# Patient Record
Sex: FEMALE | Race: WHITE | NOT HISPANIC OR LATINO | URBAN - METROPOLITAN AREA
[De-identification: names, ages, dates, MRNs, and addresses within clinical notes are randomized per-mention and may not be internally consistent; named-entity substitution may affect disease eponyms.]

---

## 2023-10-04 ENCOUNTER — APPOINTMENT (OUTPATIENT)
Dept: PRIMARY CARE | Facility: CLINIC | Age: 73
End: 2023-10-04
Payer: COMMERCIAL

## 2024-01-19 ENCOUNTER — TELEPHONE (OUTPATIENT)
Dept: PRIMARY CARE | Facility: CLINIC | Age: 74
End: 2024-01-19
Payer: COMMERCIAL

## 2024-01-19 NOTE — TELEPHONE ENCOUNTER
----- Message from Grace Guzman MD sent at 1/19/2024 11:26 AM EST -----  Please check with patient if had follow-up diagnostic mammogram and ultrasound. Looks like had surgery so may have had testing but cannot see report in chart. Just want to confirm up to date with f/up of mammogram from 6/2023.

## 2024-01-19 NOTE — TELEPHONE ENCOUNTER
"Called pt.  States \"I did not have a follow up mammogram.  I did see the breast surgeon twice, and she agreed that it was due to surgery and scarring and now the lump is totally gone.  She did not feel that anything else needed to be done.\"    "

## 2024-04-02 ENCOUNTER — APPOINTMENT (OUTPATIENT)
Dept: PRIMARY CARE | Facility: CLINIC | Age: 74
End: 2024-04-02
Payer: COMMERCIAL

## 2024-04-04 ENCOUNTER — APPOINTMENT (OUTPATIENT)
Dept: PRIMARY CARE | Facility: CLINIC | Age: 74
End: 2024-04-04
Payer: COMMERCIAL

## 2024-04-30 ENCOUNTER — APPOINTMENT (OUTPATIENT)
Dept: PRIMARY CARE | Facility: CLINIC | Age: 74
End: 2024-04-30

## 2024-05-09 ENCOUNTER — APPOINTMENT (OUTPATIENT)
Dept: PRIMARY CARE | Facility: CLINIC | Age: 74
End: 2024-05-09

## 2024-05-29 PROBLEM — R87.810 CERVICAL HIGH RISK HUMAN PAPILLOMAVIRUS (HPV) DNA TEST POSITIVE: Status: ACTIVE | Noted: 2024-05-29

## 2024-05-29 PROBLEM — N63.0 BREAST MASS: Status: ACTIVE | Noted: 2024-05-29

## 2024-05-29 PROBLEM — Z95.0 H/O CARDIAC PACEMAKER: Status: ACTIVE | Noted: 2024-05-29

## 2024-05-29 PROBLEM — E55.9 VITAMIN D DEFICIENCY: Status: ACTIVE | Noted: 2024-05-29

## 2024-05-29 PROBLEM — E66.9 OBESE: Status: ACTIVE | Noted: 2024-05-29

## 2024-05-29 PROBLEM — E04.2 MULTINODULAR GOITER: Status: ACTIVE | Noted: 2024-05-29

## 2024-05-29 PROBLEM — D72.819 LEUKOPENIA: Status: ACTIVE | Noted: 2024-05-29

## 2024-05-29 PROBLEM — Z98.890 PONV (POSTOPERATIVE NAUSEA AND VOMITING): Status: ACTIVE | Noted: 2023-02-02

## 2024-05-29 PROBLEM — R11.2 PONV (POSTOPERATIVE NAUSEA AND VOMITING): Status: ACTIVE | Noted: 2023-02-02

## 2024-05-29 RX ORDER — PNV NO.95/FERROUS FUM/FOLIC AC 28MG-0.8MG
1 TABLET ORAL DAILY
COMMUNITY

## 2024-05-29 RX ORDER — CYCLOSPORINE 0.5 MG/ML
1 EMULSION OPHTHALMIC 2 TIMES DAILY
COMMUNITY

## 2024-05-29 RX ORDER — CHOLECALCIFEROL (VITAMIN D3) 50 MCG
1 TABLET ORAL DAILY
COMMUNITY

## 2024-05-30 ENCOUNTER — OFFICE VISIT (OUTPATIENT)
Dept: PRIMARY CARE | Facility: CLINIC | Age: 74
End: 2024-05-30
Payer: COMMERCIAL

## 2024-05-30 VITALS
HEART RATE: 60 BPM | TEMPERATURE: 97.4 F | SYSTOLIC BLOOD PRESSURE: 128 MMHG | BODY MASS INDEX: 30.51 KG/M2 | DIASTOLIC BLOOD PRESSURE: 82 MMHG | WEIGHT: 165.8 LBS | HEIGHT: 62 IN | OXYGEN SATURATION: 97 %

## 2024-05-30 DIAGNOSIS — D72.819 LEUKOPENIA, UNSPECIFIED TYPE: ICD-10-CM

## 2024-05-30 DIAGNOSIS — G89.29 CHRONIC LOW BACK PAIN, UNSPECIFIED BACK PAIN LATERALITY, UNSPECIFIED WHETHER SCIATICA PRESENT: ICD-10-CM

## 2024-05-30 DIAGNOSIS — I26.99 OTHER PULMONARY EMBOLISM WITHOUT ACUTE COR PULMONALE, UNSPECIFIED CHRONICITY (MULTI): ICD-10-CM

## 2024-05-30 DIAGNOSIS — E28.319 ASYMPTOMATIC PREMATURE MENOPAUSE: ICD-10-CM

## 2024-05-30 DIAGNOSIS — M54.50 CHRONIC LOW BACK PAIN, UNSPECIFIED BACK PAIN LATERALITY, UNSPECIFIED WHETHER SCIATICA PRESENT: ICD-10-CM

## 2024-05-30 DIAGNOSIS — Z12.31 ENCOUNTER FOR SCREENING MAMMOGRAM FOR BREAST CANCER: Primary | ICD-10-CM

## 2024-05-30 DIAGNOSIS — Z13.220 SCREENING, LIPID: ICD-10-CM

## 2024-05-30 DIAGNOSIS — M54.2 NECK PAIN: ICD-10-CM

## 2024-05-30 DIAGNOSIS — E55.9 VITAMIN D DEFICIENCY: ICD-10-CM

## 2024-05-30 PROCEDURE — 1036F TOBACCO NON-USER: CPT | Performed by: INTERNAL MEDICINE

## 2024-05-30 PROCEDURE — 1159F MED LIST DOCD IN RCRD: CPT | Performed by: INTERNAL MEDICINE

## 2024-05-30 PROCEDURE — 1125F AMNT PAIN NOTED PAIN PRSNT: CPT | Performed by: INTERNAL MEDICINE

## 2024-05-30 PROCEDURE — 99214 OFFICE O/P EST MOD 30 MIN: CPT | Performed by: INTERNAL MEDICINE

## 2024-05-30 RX ORDER — IBUPROFEN 200 MG
400 TABLET ORAL NIGHTLY PRN
COMMUNITY

## 2024-05-30 RX ORDER — TRETINOIN 0.5 MG/G
CREAM TOPICAL NIGHTLY
COMMUNITY
Start: 2024-04-18

## 2024-05-30 RX ORDER — CYCLOBENZAPRINE HCL 5 MG
5 TABLET ORAL NIGHTLY PRN
Qty: 5 TABLET | Refills: 0 | Status: SHIPPED | OUTPATIENT
Start: 2024-05-30

## 2024-05-30 ASSESSMENT — PAIN SCALES - GENERAL: PAINLEVEL: 8

## 2024-05-30 ASSESSMENT — PATIENT HEALTH QUESTIONNAIRE - PHQ9
2. FEELING DOWN, DEPRESSED OR HOPELESS: NOT AT ALL
1. LITTLE INTEREST OR PLEASURE IN DOING THINGS: NOT AT ALL
SUM OF ALL RESPONSES TO PHQ9 QUESTIONS 1 & 2: 0

## 2024-05-30 NOTE — PROGRESS NOTES
"  Chief Complaint   Patient presents with    Back Pain    Neck Pain     Pt here with c/o lower back pain, onset 6 months, and neck pain, onset 2-3 months.     74 year old woman  Last visit 6/2023.  At last visit ordered left diagnostic mammogram and ultrasound but not done.  Area on breast completely resolved. Did see surgeon and told that could happen    Years ago fell buttock. Fracture at that time.  No recent trauma. No fever/chills  No radiation down legs.  Constant pain  Taking nsaid at night but not helping  Left sided neck pain. No shoulder pain. No radiation down arms. No weakness.  If stretches will not wake up at night.      Past medical history  Pulmonary embolism 2013 while on hormone replacement therapy.  Bradycardia, pacemaker Dr Sifuentes  Chronic neutropenia previously evaluated by Dr. Leal  Multinodular goiter Dr Clayton  Knee pain Dr Puente  colon polyp colonoscopy 2017  Osteopenia Dexa 2022   left breast reduction 11/2022 [facility] Dr Felipe.    Nonsmoker  works at . boyfriend passed away. she took care of his son. she has two daughters  still working, works from home.      PHYSICAL EXAM:    Blood pressure 128/82, pulse 60, temperature 36.3 °C (97.4 °F), height 1.575 m (5' 2\"), weight 75.2 kg (165 lb 12.8 oz), SpO2 97%.  Body mass index is 30.33 kg/m².     Vital reviewed  Neck: no cervical/clavicular adenopathy  CV: RRR S1 S2 normal. No murmur. No carotid bruit.   Lungs: CTA without wrr. Breath sounds symmetric  Extremities: no pretibial edema  Neuro: speech intact. LE strength 5/5  Msk +left paraspinal neck pain  SLR negative  Sensation intact to light touch    09/2022 Dr HOPKINS. cbc, bmp  wbc 6.6 hg 13.5 platelet 235  Na 139 K 4.5 Cr 0.7 glucose 85     02/2022 Dr HOPKINS Cbc, bmp  wbc 6.6 hg 13.5 platelet 236  K 4.5 cr 0.7 glucose 85 Na 139     7/2021 tsh, t4, t3, cbc, cmp  White blood cell count 5.5 hemoglobin 13.7  Creatinine 0.6 glucose 80 liver function test negative TSH low at 0.21 creatinine 0.6 normal " t3 and t4  lipid done through work in 2021     ASSESSMENT AND PLAN:  1. Encounter for screening mammogram for breast cancer  - BI mammo bilateral screening tomosynthesis; Future    2. Chronic low back pain, unspecified back pain laterality, unspecified whether sciatica present    - XR lumbar spine complete 4+ views; Future  - XR sacrum coccyx 2+ views; Future  - PT eval and treat; Future    3. Neck pain  - XR cervical spine complete 4-5 views; Future  - PT eval and treat; Future  - cyclobenzaprine (Flexeril) 5 mg tablet; Take 1 tablet (5 mg) by mouth as needed at bedtime for muscle spasms.  Dispense: 5 tablet; Refill: 0    4. Vitamin D deficiency   Normal level 3/2024 at 38. Checked by Dr Clayton    5. Leukopenia, unspecified type    - CBC; Future    6. Other pulmonary embolism without acute cor pulmonale, unspecified chronicity (Multi)    - Comprehensive Metabolic Panel; Future    7. Screening, lipid  - Lipid Panel; Future    8. Asymptomatic premature menopause    - XR DEXA bone density; Future    Mammogram 01/2023 negative-ordered.  Dexa 01/2021 T -2.1-ordered  colonoscopy 9/2017 Dr Sharpe due 2022- has scheduled for 6/14.      Goiter us and thyroid studies per Dr Clayton  Breast mass resolved.she did see surgeon.    Current Outpatient Medications on File Prior to Visit   Medication Sig Dispense Refill    cholecalciferol (Vitamin D-3) 50 MCG (2000 UT) tablet Take 1 tablet (2,000 Units) by mouth once daily.      cyanocobalamin (Vitamin B-12) 100 mcg tablet Take 1 tablet (100 mcg) by mouth once daily.      ibuprofen 200 mg tablet Take 2 tablets (400 mg) by mouth as needed at bedtime for mild pain (1 - 3).      MAGNESIUM OXIDE ORAL 1 tablet once daily.      Restasis 0.05 % ophthalmic emulsion Administer 1 drop into both eyes twice a day.      tretinoin (Retin-A) 0.05 % cream Apply topically once daily at bedtime.      ZINC ACETATE ORAL Take 1 tablet by mouth once daily.       No current facility-administered  medications on file prior to visit.

## 2024-05-31 ENCOUNTER — TELEPHONE (OUTPATIENT)
Dept: PRIMARY CARE | Facility: CLINIC | Age: 74
End: 2024-05-31
Payer: COMMERCIAL

## 2024-05-31 NOTE — TELEPHONE ENCOUNTER
----- Message from Grace Guzman MD sent at 5/30/2024  5:41 PM EDT -----  Xray of coccyx is ok. Xray of lumbar spine shows changes consistent with moderate arthritis. Xray of cervical spine shows moderate arthritis and foraminal stenosis (narrowing of the openings for nerves to leave the spine to supply the arm. This is from arthritis.

## 2024-07-06 ENCOUNTER — LAB (OUTPATIENT)
Dept: LAB | Facility: LAB | Age: 74
End: 2024-07-06
Payer: COMMERCIAL

## 2024-07-06 DIAGNOSIS — Z13.220 SCREENING, LIPID: ICD-10-CM

## 2024-07-06 DIAGNOSIS — I26.99 OTHER PULMONARY EMBOLISM WITHOUT ACUTE COR PULMONALE, UNSPECIFIED CHRONICITY (MULTI): ICD-10-CM

## 2024-07-06 DIAGNOSIS — D72.819 LEUKOPENIA, UNSPECIFIED TYPE: ICD-10-CM

## 2024-07-06 LAB
ALBUMIN SERPL BCP-MCNC: 4.2 G/DL (ref 3.4–5)
ALP SERPL-CCNC: 59 U/L (ref 33–136)
ALT SERPL W P-5'-P-CCNC: 16 U/L (ref 7–45)
ANION GAP SERPL CALC-SCNC: 11 MMOL/L (ref 10–20)
AST SERPL W P-5'-P-CCNC: 17 U/L (ref 9–39)
BILIRUB SERPL-MCNC: 0.4 MG/DL (ref 0–1.2)
BUN SERPL-MCNC: 26 MG/DL (ref 6–23)
CALCIUM SERPL-MCNC: 9.4 MG/DL (ref 8.6–10.6)
CHLORIDE SERPL-SCNC: 104 MMOL/L (ref 98–107)
CHOLEST SERPL-MCNC: 216 MG/DL (ref 0–199)
CHOLESTEROL/HDL RATIO: 2.8
CO2 SERPL-SCNC: 30 MMOL/L (ref 21–32)
CREAT SERPL-MCNC: 0.7 MG/DL (ref 0.5–1.05)
EGFRCR SERPLBLD CKD-EPI 2021: >90 ML/MIN/1.73M*2
ERYTHROCYTE [DISTWIDTH] IN BLOOD BY AUTOMATED COUNT: 12.2 % (ref 11.5–14.5)
GLUCOSE SERPL-MCNC: 88 MG/DL (ref 74–99)
HCT VFR BLD AUTO: 40.1 % (ref 36–46)
HDLC SERPL-MCNC: 77.1 MG/DL
HGB BLD-MCNC: 13.5 G/DL (ref 12–16)
LDLC SERPL CALC-MCNC: 117 MG/DL
MCH RBC QN AUTO: 30.1 PG (ref 26–34)
MCHC RBC AUTO-ENTMCNC: 33.7 G/DL (ref 32–36)
MCV RBC AUTO: 89 FL (ref 80–100)
NON HDL CHOLESTEROL: 139 MG/DL (ref 0–149)
NRBC BLD-RTO: 0 /100 WBCS (ref 0–0)
PLATELET # BLD AUTO: 223 X10*3/UL (ref 150–450)
POTASSIUM SERPL-SCNC: 4.4 MMOL/L (ref 3.5–5.3)
PROT SERPL-MCNC: 6.3 G/DL (ref 6.4–8.2)
RBC # BLD AUTO: 4.49 X10*6/UL (ref 4–5.2)
SODIUM SERPL-SCNC: 141 MMOL/L (ref 136–145)
TRIGL SERPL-MCNC: 111 MG/DL (ref 0–149)
VLDL: 22 MG/DL (ref 0–40)
WBC # BLD AUTO: 4.3 X10*3/UL (ref 4.4–11.3)

## 2024-07-06 PROCEDURE — 80053 COMPREHEN METABOLIC PANEL: CPT

## 2024-07-06 PROCEDURE — 36415 COLL VENOUS BLD VENIPUNCTURE: CPT

## 2024-07-06 PROCEDURE — 80061 LIPID PANEL: CPT

## 2024-07-06 PROCEDURE — 85027 COMPLETE CBC AUTOMATED: CPT

## 2024-07-09 ENCOUNTER — APPOINTMENT (OUTPATIENT)
Dept: PHYSICAL THERAPY | Facility: CLINIC | Age: 74
End: 2024-07-09
Payer: COMMERCIAL

## 2024-07-17 ENCOUNTER — TELEPHONE (OUTPATIENT)
Dept: PRIMARY CARE | Facility: CLINIC | Age: 74
End: 2024-07-17
Payer: COMMERCIAL

## 2024-07-17 DIAGNOSIS — R92.8 ABNORMAL MAMMOGRAM: Primary | ICD-10-CM

## 2024-07-17 NOTE — TELEPHONE ENCOUNTER
Called discussed mammogram results with pt. She will call sw tomorrow to schedule additional imaging

## 2024-07-22 ENCOUNTER — HOSPITAL ENCOUNTER (OUTPATIENT)
Dept: RADIOLOGY | Facility: CLINIC | Age: 74
Discharge: HOME | End: 2024-07-22
Payer: COMMERCIAL

## 2024-07-22 DIAGNOSIS — R92.8 ABNORMAL MAMMOGRAM: ICD-10-CM

## 2024-07-24 ENCOUNTER — HOSPITAL ENCOUNTER (OUTPATIENT)
Dept: RADIOLOGY | Facility: EXTERNAL LOCATION | Age: 74
Discharge: HOME | End: 2024-07-24

## 2024-07-24 ENCOUNTER — HOSPITAL ENCOUNTER (OUTPATIENT)
Dept: RADIOLOGY | Facility: CLINIC | Age: 74
End: 2024-07-24
Payer: COMMERCIAL

## 2024-07-24 ENCOUNTER — HOSPITAL ENCOUNTER (OUTPATIENT)
Dept: RADIOLOGY | Facility: CLINIC | Age: 74
Discharge: HOME | End: 2024-07-24
Payer: COMMERCIAL

## 2024-07-24 DIAGNOSIS — R92.8 ABNORMAL MAMMOGRAM: ICD-10-CM

## 2024-07-24 PROCEDURE — G0279 TOMOSYNTHESIS, MAMMO: HCPCS | Mod: RIGHT SIDE | Performed by: STUDENT IN AN ORGANIZED HEALTH CARE EDUCATION/TRAINING PROGRAM

## 2024-07-24 PROCEDURE — 77065 DX MAMMO INCL CAD UNI: CPT | Mod: RIGHT SIDE | Performed by: STUDENT IN AN ORGANIZED HEALTH CARE EDUCATION/TRAINING PROGRAM

## 2024-07-24 PROCEDURE — 77065 DX MAMMO INCL CAD UNI: CPT | Mod: RT

## 2024-07-25 ENCOUNTER — TELEPHONE (OUTPATIENT)
Dept: PRIMARY CARE | Facility: CLINIC | Age: 74
End: 2024-07-25
Payer: COMMERCIAL

## 2024-07-25 NOTE — TELEPHONE ENCOUNTER
----- Message from Grace Guzman sent at 7/24/2024  2:33 PM EDT -----  Right diagnostic mammogram shows benign changes. Recommend repeat in 1 year

## 2024-09-10 ENCOUNTER — OFFICE VISIT (OUTPATIENT)
Dept: ORTHOPEDIC SURGERY | Facility: CLINIC | Age: 74
End: 2024-09-10
Payer: COMMERCIAL

## 2024-09-10 DIAGNOSIS — M47.812 MULTILEVEL CERVICAL SPONDYLOSIS WITHOUT MYELOPATHY: ICD-10-CM

## 2024-09-10 DIAGNOSIS — M47.816 SPONDYLOSIS OF LUMBAR REGION WITHOUT MYELOPATHY OR RADICULOPATHY: ICD-10-CM

## 2024-09-10 DIAGNOSIS — M50.30 DEGENERATIVE DISC DISEASE, CERVICAL: Primary | ICD-10-CM

## 2024-09-10 PROCEDURE — 99203 OFFICE O/P NEW LOW 30 MIN: CPT | Performed by: PHYSICIAN ASSISTANT

## 2024-09-10 PROCEDURE — 99213 OFFICE O/P EST LOW 20 MIN: CPT | Performed by: PHYSICIAN ASSISTANT

## 2024-09-10 PROCEDURE — 1159F MED LIST DOCD IN RCRD: CPT | Performed by: PHYSICIAN ASSISTANT

## 2024-09-10 PROCEDURE — 1125F AMNT PAIN NOTED PAIN PRSNT: CPT | Performed by: PHYSICIAN ASSISTANT

## 2024-09-10 RX ORDER — MELOXICAM 15 MG/1
15 TABLET ORAL DAILY
Qty: 90 TABLET | Refills: 2 | Status: SHIPPED | OUTPATIENT
Start: 2024-09-10

## 2024-09-10 ASSESSMENT — PAIN DESCRIPTION - DESCRIPTORS: DESCRIPTORS: ACHING;THROBBING;SORE

## 2024-09-10 ASSESSMENT — PAIN SCALES - GENERAL: PAINLEVEL_OUTOF10: 8

## 2024-09-10 ASSESSMENT — PAIN - FUNCTIONAL ASSESSMENT: PAIN_FUNCTIONAL_ASSESSMENT: 0-10

## 2024-09-11 NOTE — PROGRESS NOTES
HPI:  Vane Lovell is a 74 y.o. female who presents to the spine clinic for evaluation of neck and low back pain.     Reports pain ongoing for several months. No injury or inciting event. Back pain more bothersome than the neck and is worse with activity such as bending. Denies radiating pain in the upper or lower extremities. Denies numbness/tingling/weakness. No issues with walking/balance/coordination.     She has seen her PCP who ordered xrays which she brought today for review. She was been following with a chiropractor for several months undergoing adjustments without improvement of pain. She has not had a HEP or PT. Not taking any medication at this time.    She is not diabetic. She is not a smoker.     ROS:  Reviewed on EMR and patient intake sheet.    PMH/SH:  Reviewed on EMR and patient intake sheet.    Exam:  Physical Exam  Spine Musculoskeletal Exam    Well appearing, NAD  Pleasant & cooperative  BMI 30.33  Decreased ROM lumbar spine with rotation, flexion/extension  No paraspinal muscle spasms  Upper & lower extremity sensation intact to light touch  Upper & lower extremity motor 5/5 throughout  normal gait & station  Hyporeflexic; neg hoffmans  Neg SLR bl    Radiology:     Xrays cervical and lumbar spine dated 05/30/24 personally reviewed along with the accompanying rad reports.   Straightening of the normal cervical lordosis. No fractures. Moderate disc degeneration worst at C4-5 with anterior osteophyte formation.  Mild levoscoliosis of the lumbar spine. No fractures or spondylolisthesis. Mild disc space loss throughout the lumbar spine.     Assessment and Plan:  1. Multilevel cervical spondylosis without myelopathy  - Referral to Physical Therapy; Future  - Referral to Pain Management; Future    2. Degenerative disc disease, cervical    3. Spondylosis of lumbar region without myelopathy or radiculopathy  - meloxicam (Mobic) 15 mg tablet; Take 1 tablet (15 mg) by mouth once daily.  Dispense: 90  tablet; Refill: 2    I reviewed the imaging studies with Vane Lovell in detail today. Patient with axial spine pain that does not radiate into the upper or lower extremities. We discussed the pathology and natural course of degenerative disc disease and spondylosis. I educated the patient on several lifestyle modifications that include increased walking, weight management, and a routine exercise plan that includes core strengthening. The patient was educated that this pain may fluctuate over time.     Referral placed to physical therapy today. Prescription for Meloxicam provided for pain relief.     Patient requested referral to pain management for consideration of injections, which was provided today.    She can follow up on an as needed basis. No role for surgical intervention at this time.    Patient in agreement to plan of care. Of course Vane Ellswortherham is welcome to call at anytime with questions or concerns.     Eugenia Larson PA-C  Department of Orthopaedic Surgery    [address] [place] [address]    Voicemail: [phone]   Appts: [phone]  Fax: [phone]

## 2024-10-03 ENCOUNTER — OFFICE VISIT (OUTPATIENT)
Dept: PAIN MEDICINE | Facility: HOSPITAL | Age: 74
End: 2024-10-03
Payer: COMMERCIAL

## 2024-10-03 DIAGNOSIS — M47.812 MULTILEVEL CERVICAL SPONDYLOSIS WITHOUT MYELOPATHY: ICD-10-CM

## 2024-10-03 DIAGNOSIS — M47.816 LUMBAR SPONDYLOSIS: ICD-10-CM

## 2024-10-03 PROCEDURE — 1159F MED LIST DOCD IN RCRD: CPT | Performed by: ANESTHESIOLOGY

## 2024-10-03 PROCEDURE — 1125F AMNT PAIN NOTED PAIN PRSNT: CPT | Performed by: ANESTHESIOLOGY

## 2024-10-03 PROCEDURE — 99211 OFF/OP EST MAY X REQ PHY/QHP: CPT | Performed by: ANESTHESIOLOGY

## 2024-10-03 ASSESSMENT — PAIN SCALES - GENERAL: PAINLEVEL: 7

## 2024-10-03 NOTE — PROGRESS NOTES
Subjective   Patient ID: Vane Lovell is a 74 y.o. female with a past medical history of sick sinus syndrome status post cardiac pacemaker, osteoarthritis who presents as a new patient referred by primary care physician for lower back pain.     HPI:   Vane Lovell is a 74 y.o. female with a past medical history of sick sinus syndrome status post cardiac pacemaker, osteoarthritis who presents as a new patient referred by primary care physician for lower back pain.  Patient reports that she has had lower back pain for many months that has progressively gotten worse. She does not remember any inciting events that lead to the pain. She reports that the pain is a 8/10 dull/achy pain that is worst in the morning and gets better throughout the day. Activities such as bending forward, twisting make the pain worse. She reports that the pain does wake her up at nighttime when it is severe. When she gets up she often does ab exercises and stretches her back out which helps alleviate the pain. She has tried topical creams, meloxicam without significant relief. She takes IBU sometimes at nighttime that she reports may be helping her pain a little bit. She reports no weakness in her BLE, numbness, or tingling. Denies bowel/bladder incontinence. She denies any tripping or falling. She has done chiropractic care with minimal relief. She reports that heat helps the pain. No radicular pain to her BLE    Physical Therapy:  starting PT next week  Other Conservative Measures she has tried: Chiropractic, Heating Pad, and Massage/myofascial release  Classes of medications tried in the past: Acetaminophen, NSAIDs, and Topical agents    Review of Systems   13-point ROS done and negative except for HPI.     Current Outpatient Medications   Medication Instructions    cholecalciferol (Vitamin D-3) 50 MCG (2000 UT) tablet 1 tablet, oral, Daily    ibuprofen 400 mg, oral, Nightly PRN    meloxicam (MOBIC) 15 mg, oral, Daily    Restasis  0.05 % ophthalmic emulsion 1 drop, Both Eyes, 2 times daily    tretinoin (Retin-A) 0.05 % cream Topical, Nightly       No past medical history on file.     Past Surgical History:   Procedure Laterality Date    OTHER SURGICAL HISTORY  2021    Thyroid biopsy core needle    OTHER SURGICAL HISTORY  2021    Colonoscopy    OTHER SURGICAL HISTORY  2021    Pacemaker insertion    OTHER SURGICAL HISTORY  2021     section    OTHER SURGICAL HISTORY  2021    Gallbladder surgery        Family History   Problem Relation Name Age of Onset    Other (malignant neoplasm) Mother      Other (malignant neoplasm) Father      Heart attack Brother          Allergies   Allergen Reactions    Hydrocortisone Other     ?tachycardia    Iodinated Contrast Media Hives    Lecithin Nausea/vomiting    Soy Unknown    Codeine Other, Rash and Nausea/vomiting     Nausea    Morphine (Pf) Other     nightmares        Objective     There were no vitals filed for this visit.     Physical Exam  General: NAD, well groomed, well nourished  Eyes: Non-icteric sclera, EOMI  Ears, Nose, Mouth, and Throat: External ears and nose appear to be without deformity or rash. No lesions or masses noted. Hearing is grossly intact.   Neck: Trachea midline  Respiratory: Nonlabored breathing   Cardiovascular: trace peripheral edema   Skin: No rashes or open lesions/ulcers identified on skin.    Back:   Palpation: Mild tenderness to palpation over lumbar paraspinous muscles.   Straight leg raise: does not reproduce their pain, bilaterally   Hip: No pain over greater trochanters. and Pain not reproduced with hip internal/external rotation.     Neurologic:   Cranial nerves grossly intact.   Strength 5/5 BLE and symmetric plantar/dorsiflexion   Sensation: Normal to light touch throughout intact throughout.  DTRs:normal and symmetric throughout    Psychiatric: Alert, orientation to person, place, and time. Cooperative.    Imaging personally  reviewed:   Lumbar Spine Xray (5/30/2024):  Findings:   There are 5 lumbar vertebral bodies with mild levoscoliosis. No fracture or compression deformity or significant listhesis. Multilevel small to moderate endplate spurs and only mild disc space narrowing. Lower lumbar facet arthropathy. Symmetric SI joints. No sacral lesion. Moderate to large amount of stool in the colon..   IMPRESSION:   Degenerative changes and mild levoscoliosis.     Cervical Spine Xray (5/30/24):  FINDINGS:   Lateral view shows straightening of lordosis but otherwise normal cervical body height and alignment. No aggressive bone lesion or compression deformity. There is moderate to severe disc space narrowing with large endplate spurs at multiple levels. Also moderate multilevel facet arthropathy. Bilateral uncinate spurring. Suggestion of foraminal stenosis at C3-4 bilaterally. Normal prevertebral soft tissue space and C1-C2.   IMPRESSION:   Moderate degenerative changes. Bilateral upper cervical foraminal stenosis.     Assessment/Plan   Vane Lovell is a 74 y.o. female with a past medical history of sick sinus syndrome status post cardiac pacemaker, osteoarthritis who presents as a new patient referred by primary care physician for lower back pain.  Pain is 8 out of 10 at its worst it is a dull achy pain that is located in the lower back.  It is axial back pain with no radicular symptoms and no weakness in her bilateral lower extremities or numbness or tingling.  She denies bowel or bladder incontinence.  Pain is worse with bending and twisting activities.  Pain is the worst in the morning gets better throughout the day.  She reports that the pain does wake her up in the middle the night sometimes.  She has tried meloxicam, topicals and chiropractic care.  She is going to start physical therapy next week.  She is x-rays done with multiple areas of arthritic changes.  On exam the patient has no motor or sensory deficits.  Patient likely  is having pain due to lumbar facet arthropathy.  We offer the patient bilateral diagnostic medial branch block and if she has good pain relief with this we can consider radiofrequency ablation for her.  We discussed that she should go to physical therapy and continue those exercises at home as tolerated.    Plan:  -Schedule patient for bilateral L3-4, L4-5 and L5-S1 diagnostic medial branch block #1 under fluoroscopy guidance  - We discussed  the risks, benefits and alternatives of the procedure including but not limited to: , Lack of efficacy , Transiently worsening pain , Bleeding, Infection , and Nerve Damage  -If patient has good relief with 2 diagnostic medial branch blocks then we can consider radiofrequency ablation  -Discussed with patient that physical therapy is an important part of the treatment process and that she should continue with every therapy exercises she learned during physical therapy at home as tolerated    Follow up: After procedure    The patient was invited to contact us back anytime with any questions or concerns and follow-up with us in the office as needed.     Diagnoses and all orders for this visit:  Multilevel cervical spondylosis without myelopathy  -     Referral to Pain Management      This note was generated with the aid of dictation software, there may be typos despite my attempts at proofreading.   The patient was discussed and seen with Dr. Sexton.  Reggie Adan MD  PGY-5  Interventional Pain Fellow

## 2024-10-08 ENCOUNTER — APPOINTMENT (OUTPATIENT)
Dept: PHYSICAL THERAPY | Facility: CLINIC | Age: 74
End: 2024-10-08
Payer: COMMERCIAL

## 2024-10-21 ENCOUNTER — HOSPITAL ENCOUNTER (OUTPATIENT)
Facility: HOSPITAL | Age: 74
Discharge: HOME | End: 2024-10-21
Payer: COMMERCIAL

## 2024-10-21 VITALS
RESPIRATION RATE: 17 BRPM | SYSTOLIC BLOOD PRESSURE: 186 MMHG | OXYGEN SATURATION: 100 % | HEIGHT: 62 IN | DIASTOLIC BLOOD PRESSURE: 98 MMHG | HEART RATE: 60 BPM | TEMPERATURE: 98 F | BODY MASS INDEX: 29.44 KG/M2 | WEIGHT: 160 LBS

## 2024-10-21 DIAGNOSIS — M47.816 LUMBAR SPONDYLOSIS: ICD-10-CM

## 2024-10-21 PROCEDURE — 64494 INJ PARAVERT F JNT L/S 2 LEV: CPT | Mod: 50 | Performed by: ANESTHESIOLOGY

## 2024-10-21 PROCEDURE — 64493 INJ PARAVERT F JNT L/S 1 LEV: CPT | Performed by: ANESTHESIOLOGY

## 2024-10-21 PROCEDURE — 64493 INJ PARAVERT F JNT L/S 1 LEV: CPT | Mod: 50 | Performed by: ANESTHESIOLOGY

## 2024-10-21 PROCEDURE — 2500000004 HC RX 250 GENERAL PHARMACY W/ HCPCS (ALT 636 FOR OP/ED): Performed by: ANESTHESIOLOGY

## 2024-10-21 PROCEDURE — 64494 INJ PARAVERT F JNT L/S 2 LEV: CPT | Performed by: ANESTHESIOLOGY

## 2024-10-21 RX ORDER — LIDOCAINE HYDROCHLORIDE 20 MG/ML
INJECTION, SOLUTION EPIDURAL; INFILTRATION; INTRACAUDAL; PERINEURAL
Status: COMPLETED | OUTPATIENT
Start: 2024-10-21 | End: 2024-10-21

## 2024-10-21 ASSESSMENT — ENCOUNTER SYMPTOMS: OCCASIONAL FEELINGS OF UNSTEADINESS: 0

## 2024-10-21 ASSESSMENT — COLUMBIA-SUICIDE SEVERITY RATING SCALE - C-SSRS
1. IN THE PAST MONTH, HAVE YOU WISHED YOU WERE DEAD OR WISHED YOU COULD GO TO SLEEP AND NOT WAKE UP?: NO
2. HAVE YOU ACTUALLY HAD ANY THOUGHTS OF KILLING YOURSELF?: NO
6. HAVE YOU EVER DONE ANYTHING, STARTED TO DO ANYTHING, OR PREPARED TO DO ANYTHING TO END YOUR LIFE?: NO

## 2024-10-21 ASSESSMENT — PAIN SCALES - GENERAL
PAINLEVEL_OUTOF10: 6
PAINLEVEL_OUTOF10: 7
PAINLEVEL_OUTOF10: 5 - MODERATE PAIN

## 2024-10-21 ASSESSMENT — PAIN - FUNCTIONAL ASSESSMENT
PAIN_FUNCTIONAL_ASSESSMENT: 0-10
PAIN_FUNCTIONAL_ASSESSMENT: 0-10
PAIN_FUNCTIONAL_ASSESSMENT: WONG-BAKER FACES

## 2024-10-21 NOTE — H&P
HISTORY AND PHYSICAL    History Of Present Illness  Vane Lovell is a 74 y.o. female presenting with chronic pain here for procedure as stated below. Patient denies any changes to health since the last visit to our clinic.    Past Medical History  No past medical history on file.    Surgical History  Past Surgical History:   Procedure Laterality Date    OTHER SURGICAL HISTORY  2021    Thyroid biopsy core needle    OTHER SURGICAL HISTORY  2021    Colonoscopy    OTHER SURGICAL HISTORY  2021    Pacemaker insertion    OTHER SURGICAL HISTORY  2021     section    OTHER SURGICAL HISTORY  2021    Gallbladder surgery        Social History  She reports that she has never smoked. She has never used smokeless tobacco. No history on file for alcohol use and drug use.    Family History  Family History   Problem Relation Name Age of Onset    Other (malignant neoplasm) Mother      Other (malignant neoplasm) Father      Heart attack Brother          Allergies  Hydrocortisone, Iodinated contrast media, Lecithin, Soy, Codeine, and Morphine (pf)    Review of Systems  12 point ROS done and negative except for the above.     Physical Exam  General: NAD, well groomed, well nourished  Eyes: Non-icteric sclera, EOMI  Ears, Nose, Mouth, and Throat: External ears and nose appear to be without deformity or rash. No lesions or masses noted. Hearing is grossly intact.   Neck: Trachea midline  Respiratory: Nonlabored breathing   Cardiovascular: No peripheral edema   Skin: No rashes or open lesions/ulcers identified on skin.      Last Recorded Vitals  There were no vitals taken for this visit.    Relevant Results  Current Outpatient Medications   Medication Instructions    cholecalciferol (Vitamin D-3) 50 MCG (2000) tablet 1 tablet, oral, Daily    ibuprofen 400 mg, oral, Nightly PRN    meloxicam (MOBIC) 15 mg, oral, Daily    Restasis 0.05 % ophthalmic emulsion 1 drop, Both Eyes, 2 times daily     tretinoin (Retin-A) 0.05 % cream Topical, Nightly       No results found for this or any previous visit from the past 1000 days.     No image results found.     No diagnosis found.        Assessment/Plan   Vane Lovell is a 74 y.o. female presenting with chronic pain here for bilateral L3-4, L4-5 and L5-S1 diagnostic medial branch block #1; she denies any recent antibiotic use or infections, she denies any blood thinner use , and she denies contrast or local anesthetic allergies.    ASA PS Classification: 3  Mallampati score: 2    Plan:  - We will proceed with the procedure as above. We discussed extensively the risks, benefits, and alternatives to the procedure. The patient's questions were addressed and answered in detail. The patient demonstrated understanding of the procedure, and is amenable to proceeding with it. The Risks of the procedure that were discussed with the patient include but are not limited to the following: A lack of efficacy, transient worsening of pain, bleeding, infection, nerve injury, nerve damage, neuritis or sunburn sensation. Informed consent obtained as attached to EMR.  - Patient to continue physician directed home exercises as tolerated.  - Patient will follow-up with us in clinic.      Carlos Brewer MD  Chronic Pain Fellow  [facility]

## 2024-10-21 NOTE — DISCHARGE INSTRUCTIONS
DISCHARGE INSTRUCTIONS FOR INJECTIONS     You underwent lumbar medial branch blocks today    After most injections, it is recommended that you relax and limit your activity for the remainder of the day unless you have been told otherwise by your pain physician.  You should not drive a car, operate machinery, or make important legal decisions unless otherwise directed by your pain physician.  You may resume your normal activity, including exercise, tomorrow.      Keep a written pain diary of how much pain relief you experienced following the injection procedure and the length of time of pain relief you experienced pain relief. Following diagnostic injections like medial branch nerve blocks, sacroiliac joint blocks, stellate ganglion injections and other blocks, it is very important you record the specific amount of pain relief you experienced immediately after the injectionand how long it lasted. Your doctor will ask you for this information at your follow up visit.     For all injections, please keep the injection site dry and inspect the site for a couple of days. You may remove the Band-Aid the day of the injection at any time.     Some discomfort, bruising or slight swelling may occur at the injection site. This is not abnormal if it occurs.  If needed you may:    -Take over the counter medication such as Tylenol or Motrin.   -Apply an ice pack for 30 minutes, 2 to 3 times a day for the first 24 hours.     You may shower today; no soaking baths, hot tubs, whirlpools or swimming pools for two days.      If you are given steroids in your injection, it may take 3-5 days for the steroid medication to take effect. You may notice a worsening of your symptoms for 1-2 days after the injection. This is not abnormal.  You may use acetaminophen, ibuprofen, or prescription medication that your doctor may have prescribed for you if you need to do so.     A few common side effects of steroids include facial flushing, sweating,  restlessness, irritability,difficulty sleeping, increase in blood sugar, and increased blood pressure. If you have diabetes, please monitor your blood sugar at least once a day for at least 5 days. If you have poorly controlled high blood pressure, monitoryour blood pressure for at least 2 days and contact your primary care physician if these numbers are unusually high for you.      If you take aspirin or non-steroidal anti-inflammatory drugs (examples are Motrin, Advil, ibuprofen, Naprosyn, Voltaren, Relafen, etc.) you may restart these this evening, but stop taking it 3 days before your next appointment, unless instructed otherwiseby your physician.      You do not need to discontinue non-aspirin-containing pain medications prior to an injection (examples: Celebrex, tramadol, hydrocodone and acetaminophen).      If you take a blood thinning medication (Coumadin, Lovenox, Fragmin,Ticlid, Plavix, Pradaxa, etc.), please discuss this with your primary care physician/cardiologist and your pain physician. These medications MUST be discontinued before you can have an injection safely, without the risk of uncontrolled bleeding. If these medications are not discontinued for an appropriate period of time, you will not be able to receivean injection. Please adhere to instructions given to you about when to restart your blood thinning medication. If you have any questions please reach out to our team.    If you are taking Coumadin, please have your INR checked the morning of your procedure and bring the result to your appointment unless otherwise instructed. If your INR is over 1.2, your injection may need to be rescheduled to avoid uncontrolled bleeding from the needle placement.     Call [facility]  and ask for Pain Management at [phone] between 8am-4pm Monday - Friday if you are experiencing the following:    If you received an epidural or spinal injection:    -Headache that doesnot go away with medicine, is worse  when sitting or standing up, and is greatly relieved upon lying down.   -Severe pain worse than or different than your baseline pain.   -Chills or fever (101º F or greater).   -Drainage or signs of infection at the injection site     Go directly to the Emergency Department if you are experiencing the following and received an epidural or spinal injection:   -Abrupt weakness or progressive weakness in your legs that starts after you leave the clinic.   -Abrupt severe or worsening numbness in your legs.   -Inability to urinate after the injection or loss of bowel or bladder control without the urge to defecate or urinate.     If you have a clinical question that cannot wait until your next appointment, please call [phone] between 8am-4pm Monday - Friday or send a [employer] message. We do our best to return all non-emergency messages within 24 hours, Monday - Friday. A nurse or physician will return your message. You may also the appropriate nurse below, and they will do their best to answer your questions.  - For Dr. Sexton, call Rina at [phone]  - For Dr. Taylor, call Elke at [phone]  - For Dr. Cardozo, call Elke at [phone]  - For Dr. Cao, call Summer at [phone]  - For Dr. Wooten, call Summer at [phone]    If you need to cancel an appointment, please call the scheduling staff at [phone] during normal business hours or leave a message at least 24 hours in advance.     If you are going to be sedated for your next procedure, you MUST have responsible adult who can legally drive accompany you home. You cannot eat or drink for at least eight hours prior to the planned procedure if you are going to receive sedation. You may take your non-blood thinning medications with a small sip of water.

## 2024-10-24 DIAGNOSIS — M47.816 LUMBAR SPONDYLOSIS: ICD-10-CM

## 2024-10-30 ENCOUNTER — APPOINTMENT (OUTPATIENT)
Dept: PHYSICAL THERAPY | Facility: CLINIC | Age: 74
End: 2024-10-30
Payer: COMMERCIAL

## 2024-11-06 ASSESSMENT — ENCOUNTER SYMPTOMS
PALPITATIONS: 1
HYPERTENSION: 1
SHORTNESS OF BREATH: 1
SWEATS: 1

## 2024-11-07 ENCOUNTER — TELEPHONE (OUTPATIENT)
Dept: PAIN MEDICINE | Facility: HOSPITAL | Age: 74
End: 2024-11-07
Payer: COMMERCIAL

## 2024-11-08 ENCOUNTER — OFFICE VISIT (OUTPATIENT)
Dept: PRIMARY CARE | Facility: CLINIC | Age: 74
End: 2024-11-08
Payer: COMMERCIAL

## 2024-11-08 VITALS
SYSTOLIC BLOOD PRESSURE: 122 MMHG | DIASTOLIC BLOOD PRESSURE: 76 MMHG | WEIGHT: 164 LBS | BODY MASS INDEX: 30.18 KG/M2 | HEART RATE: 60 BPM | HEIGHT: 62 IN

## 2024-11-08 DIAGNOSIS — R09.89 LABILE HYPERTENSION: Primary | ICD-10-CM

## 2024-11-08 PROCEDURE — 3008F BODY MASS INDEX DOCD: CPT | Performed by: FAMILY MEDICINE

## 2024-11-08 PROCEDURE — 99213 OFFICE O/P EST LOW 20 MIN: CPT | Performed by: FAMILY MEDICINE

## 2024-11-08 PROCEDURE — 3074F SYST BP LT 130 MM HG: CPT | Performed by: FAMILY MEDICINE

## 2024-11-08 PROCEDURE — 3078F DIAST BP <80 MM HG: CPT | Performed by: FAMILY MEDICINE

## 2024-11-08 PROCEDURE — 1159F MED LIST DOCD IN RCRD: CPT | Performed by: FAMILY MEDICINE

## 2024-11-08 ASSESSMENT — PATIENT HEALTH QUESTIONNAIRE - PHQ9
SUM OF ALL RESPONSES TO PHQ9 QUESTIONS 1 AND 2: 0
1. LITTLE INTEREST OR PLEASURE IN DOING THINGS: NOT AT ALL
2. FEELING DOWN, DEPRESSED OR HOPELESS: NOT AT ALL

## 2024-11-08 ASSESSMENT — ENCOUNTER SYMPTOMS
SHORTNESS OF BREATH: 1
HYPERTENSION: 1
SWEATS: 1
PALPITATIONS: 1

## 2024-11-08 NOTE — PROGRESS NOTES
"Subjective   Patient ID: Vane Lovell is a 74 y.o. female who presents for Follow-up (Bp issues high 170/90 for last 3 weeks).    Elevated readings   - reports she was having issues with intermittent elevated blood pressure readings over the last week   - reports she has had readings as high as 170/90 confirmed in a physicians office and at the pharmacy    - elevated blood pressure caused anxiety, tightening of breathing and headache   - does have known thyroid nodules, and has been monitoring them   - has not had a significant ammount of stress   - no significant changes to the lifestyle/diet, has not been monitoring sodium, and has not been monitoring caffeine     Hypertension  This is a new problem. The problem has been gradually improving since onset. Associated symptoms include palpitations, shortness of breath and sweats. There are no associated agents to hypertension. Risk factors for coronary artery disease include family history, obesity and post-menopausal state. There are no compliance problems.         Review of Systems   Respiratory:  Positive for shortness of breath.    Cardiovascular:  Positive for palpitations.       Objective   /76   Pulse 60   Ht 1.575 m (5' 2\")   Wt 74.4 kg (164 lb)   BMI 30.00 kg/m²     Physical Exam  Constitutional:       Appearance: Normal appearance.   Cardiovascular:      Rate and Rhythm: Normal rate and regular rhythm.   Pulmonary:      Effort: Pulmonary effort is normal.      Breath sounds: Normal breath sounds.   Neurological:      Mental Status: She is alert.         Assessment/Plan   Problem List Items Addressed This Visit    None  Visit Diagnoses         Codes    Labile hypertension    -  Primary R09.89    stable   - bp controlled in office today   - discussed ambulatory monitoring parameters   - f/u with PCP                "

## 2024-11-11 ENCOUNTER — APPOINTMENT (OUTPATIENT)
Facility: HOSPITAL | Age: 74
End: 2024-11-11
Payer: COMMERCIAL

## 2024-11-15 ENCOUNTER — HOSPITAL ENCOUNTER (OUTPATIENT)
Facility: HOSPITAL | Age: 74
Discharge: HOME | End: 2024-11-15
Payer: COMMERCIAL

## 2024-11-15 ENCOUNTER — TELEPHONE (OUTPATIENT)
Facility: CLINIC | Age: 74
End: 2024-11-15

## 2024-11-15 VITALS
HEART RATE: 60 BPM | SYSTOLIC BLOOD PRESSURE: 156 MMHG | OXYGEN SATURATION: 100 % | TEMPERATURE: 98.1 F | DIASTOLIC BLOOD PRESSURE: 104 MMHG | RESPIRATION RATE: 16 BRPM

## 2024-11-15 DIAGNOSIS — M47.816 LUMBAR SPONDYLOSIS: ICD-10-CM

## 2024-11-15 PROCEDURE — 64493 INJ PARAVERT F JNT L/S 1 LEV: CPT | Performed by: ANESTHESIOLOGY

## 2024-11-15 PROCEDURE — 2500000004 HC RX 250 GENERAL PHARMACY W/ HCPCS (ALT 636 FOR OP/ED): Performed by: ANESTHESIOLOGY

## 2024-11-15 PROCEDURE — 64493 INJ PARAVERT F JNT L/S 1 LEV: CPT | Mod: 50 | Performed by: ANESTHESIOLOGY

## 2024-11-15 PROCEDURE — 64494 INJ PARAVERT F JNT L/S 2 LEV: CPT | Performed by: ANESTHESIOLOGY

## 2024-11-15 PROCEDURE — 64494 INJ PARAVERT F JNT L/S 2 LEV: CPT | Mod: 50 | Performed by: ANESTHESIOLOGY

## 2024-11-15 RX ORDER — LIDOCAINE HYDROCHLORIDE 20 MG/ML
INJECTION, SOLUTION EPIDURAL; INFILTRATION; INTRACAUDAL; PERINEURAL
Status: COMPLETED | OUTPATIENT
Start: 2024-11-15 | End: 2024-11-15

## 2024-11-15 RX ORDER — LIDOCAINE HYDROCHLORIDE 20 MG/ML
INJECTION, SOLUTION EPIDURAL; INFILTRATION; INTRACAUDAL; PERINEURAL
Status: DISPENSED
Start: 2024-11-15 | End: 2024-11-15

## 2024-11-15 ASSESSMENT — PAIN SCALES - GENERAL
PAINLEVEL_OUTOF10: 9
PAINLEVEL_OUTOF10: 0 - NO PAIN
PAINLEVEL_OUTOF10: 10 - WORST POSSIBLE PAIN
PAINLEVEL_OUTOF10: 5 - MODERATE PAIN

## 2024-11-15 ASSESSMENT — COLUMBIA-SUICIDE SEVERITY RATING SCALE - C-SSRS
2. HAVE YOU ACTUALLY HAD ANY THOUGHTS OF KILLING YOURSELF?: NO
6. HAVE YOU EVER DONE ANYTHING, STARTED TO DO ANYTHING, OR PREPARED TO DO ANYTHING TO END YOUR LIFE?: NO
1. IN THE PAST MONTH, HAVE YOU WISHED YOU WERE DEAD OR WISHED YOU COULD GO TO SLEEP AND NOT WAKE UP?: NO

## 2024-11-15 ASSESSMENT — PAIN - FUNCTIONAL ASSESSMENT
PAIN_FUNCTIONAL_ASSESSMENT: CPOT (CRITICAL CARE PAIN OBSERVATION TOOL)
PAIN_FUNCTIONAL_ASSESSMENT: 0-10
PAIN_FUNCTIONAL_ASSESSMENT: 0-10
PAIN_FUNCTIONAL_ASSESSMENT: CPOT (CRITICAL CARE PAIN OBSERVATION TOOL)

## 2024-11-15 NOTE — H&P
HISTORY AND PHYSICAL    History Of Present Illness  Vane Lovell is a 74 y.o. female presenting with chronic pain here for procedure as stated below. Patient denies any changes to health since the last visit to our clinic.    Past Medical History  Past Medical History:   Diagnosis Date    Disease of thyroid gland     Hypertension     Pacemaker        Surgical History  Past Surgical History:   Procedure Laterality Date    CHOLECYSTECTOMY      OTHER SURGICAL HISTORY  2021    Thyroid biopsy core needle    OTHER SURGICAL HISTORY  2021    Colonoscopy    OTHER SURGICAL HISTORY  2021    Pacemaker insertion    OTHER SURGICAL HISTORY  2021     section    OTHER SURGICAL HISTORY  2021    Gallbladder surgery    TUBAL LIGATION          Social History  She reports that she has never smoked. She has never used smokeless tobacco. She reports that she does not drink alcohol and does not use drugs.    Family History  Family History   Problem Relation Name Age of Onset    Other (malignant neoplasm) Mother Rina     Cancer Mother Rina     Other (malignant neoplasm) Father Jerry     Cancer Father Jerry     Heart attack Brother      Heart disease Brother Andrew         Allergies  Hydrocortisone, Iodinated contrast media, Lecithin, Soy, Soybean, Codeine, and Morphine (pf)    Review of Systems  12 point ROS done and negative except for the above.     Physical Exam  General: NAD, well groomed, well nourished  Eyes: Non-icteric sclera, EOMI  Ears, Nose, Mouth, and Throat: External ears and nose appear to be without deformity or rash. No lesions or masses noted. Hearing is grossly intact.   Neck: Trachea midline  Respiratory: Nonlabored breathing   Cardiovascular: No peripheral edema   Skin: No rashes or open lesions/ulcers identified on skin.      Last Recorded Vitals  There were no vitals taken for this visit.    Relevant Results  Current Outpatient Medications   Medication Instructions     cholecalciferol (Vitamin D-3) 50 MCG (2000 UT) tablet 1 tablet, Daily    ibuprofen 400 mg, Nightly PRN    Restasis 0.05 % ophthalmic emulsion 1 drop, 2 times daily    tretinoin (Retin-A) 0.05 % cream Nightly       No results found for this or any previous visit from the past 1000 days.      Medial Nerve Branch Block  Table formatting from the original result was not included.  Procedure  Medial Nerve Branch Block    Indication  Lumbar spondylosis    Medications  lidocaine PF (Xylocaine) 20 mg/mL (2 %) injection 3 mL   (Totals for administrations occurring from 1313 to 1326 on 10/21/24)     Preprocedure  A history and physical has been performed, and patient medication   allergies have been reviewed. The patient's tolerance of previous   anesthesia has been reviewed. The risks and benefits of the procedure and   the sedation options and risks were discussed with the patient. All   questions were answered and informed consent obtained.    Details of the Procedure  Preoperative diagnosis: Lumbar spondylosis  Postoperative diagnosis: Lumbar spondylosis  Procedure: Bilateral lumbar medial branch blocks at L3, L4, and the L5   dorsal rami of fluoroscopic guidance  Surgeon: Ameena Sexton  Assistant:  Fellow,   Anesthesia: Local  Complications: Apparently none    Clinical note: Vane is a 74-year-old female with a history of back pain   who presents today for the aforementioned procedure.  She was evaluated in   the office and has a history and physical most consistent with   spondylosis.    Procedure note: The patient was brought back to the procedure room and   placed in the prone position on the fluoroscopy table. Area over the   bilateral lumbar spine was exposed, prepped, draped, in the usual sterile   fashion.  Trajectories the aforementioned medial branches and dorsal rami   were identified.  Six 25-gauge needles were inserted and skin advanced the   appropriate locations in AP, oblique, and lateral views.  Once  the needle   tip of each needle was confirmed in the aforementioned views, 0.5 mL of 2%   lidocaine was injected through each needle.  Needles removed, bandages   applied, patient tolerated the procedure well with no immediate   complications.    We will assess the patient in the PACU and see how they have fared   following this procedure.    Initial pain was 7 out of 10.    Procedure Provider  Ameena Sexton MD    Procedure Location  [facility]  [address] [facility] [address] [phone]    Referring Provider  Ameena Sexton MD  [id number]  Department Of Anesthesiology And Perioperative Medicine  [place] [id number]     No diagnosis found.        Assessment/Plan   Vane Lovell is a 74 y.o. female presenting with chronic pain here for  bilateral L3-4, L4-5 and L5-S1 diagnostic medial branch block #2; she denies any recent antibiotic use or infections, she denies any blood thinner use , and she denies contrast or local anesthetic allergies.    ASA PS Classification: 3  Mallampati score: 2    Plan:  - We will proceed with the procedure as above. We discussed extensively the risks, benefits, and alternatives to the procedure. The patient's questions were addressed and answered in detail. The patient demonstrated understanding of the procedure, and is amenable to proceeding with it. The Risks of the procedure that were discussed with the patient include but are not limited to the following: A lack of efficacy, transient worsening of pain, bleeding, infection, nerve injury, nerve damage, neuritis or sunburn sensation. Informed consent obtained as attached to EMR.  - Patient to continue physician directed home exercises as tolerated.  - Patient will follow-up with us in clinic.      Carlos Brewer MD  Chronic Pain Fellow  [facility]

## 2024-11-15 NOTE — DISCHARGE INSTRUCTIONS
DISCHARGE INSTRUCTIONS FOR INJECTIONS     You underwent L3-4, L4-5 and L5-S1 diagnostic medial branch block #2 today    After most injections, it is recommended that you relax and limit your activity for the remainder of the day unless you have been told otherwise by your pain physician.  You should not drive a car, operate machinery, or make important legal decisions unless otherwise directed by your pain physician.  You may resume your normal activity, including exercise, tomorrow.      Keep a written pain diary of how much pain relief you experienced following the injection procedure and the length of time of pain relief you experienced pain relief. Following diagnostic injections like medial branch nerve blocks, sacroiliac joint blocks, stellate ganglion injections and other blocks, it is very important you record the specific amount of pain relief you experienced immediately after the injectionand how long it lasted. Your doctor will ask you for this information at your follow up visit.     For all injections, please keep the injection site dry and inspect the site for a couple of days. You may remove the Band-Aid the day of the injection at any time.     Some discomfort, bruising or slight swelling may occur at the injection site. This is not abnormal if it occurs.  If needed you may:    -Take over the counter medication such as Tylenol or Motrin.   -Apply an ice pack for 30 minutes, 2 to 3 times a day for the first 24 hours.     You may shower today; no soaking baths, hot tubs, whirlpools or swimming pools for two days.      If you are given steroids in your injection, it may take 3-5 days for the steroid medication to take effect. You may notice a worsening of your symptoms for 1-2 days after the injection. This is not abnormal.  You may use acetaminophen, ibuprofen, or prescription medication that your doctor may have prescribed for you if you need to do so.     A few common side effects of steroids include  facial flushing, sweating, restlessness, irritability,difficulty sleeping, increase in blood sugar, and increased blood pressure. If you have diabetes, please monitor your blood sugar at least once a day for at least 5 days. If you have poorly controlled high blood pressure, monitoryour blood pressure for at least 2 days and contact your primary care physician if these numbers are unusually high for you.      If you take aspirin or non-steroidal anti-inflammatory drugs (examples are Motrin, Advil, ibuprofen, Naprosyn, Voltaren, Relafen, etc.) you may restart these this evening, but stop taking it 3 days before your next appointment, unless instructed otherwiseby your physician.      You do not need to discontinue non-aspirin-containing pain medications prior to an injection (examples: Celebrex, tramadol, hydrocodone and acetaminophen).      If you take a blood thinning medication (Coumadin, Lovenox, Fragmin,Ticlid, Plavix, Pradaxa, etc.), please discuss this with your primary care physician/cardiologist and your pain physician. These medications MUST be discontinued before you can have an injection safely, without the risk of uncontrolled bleeding. If these medications are not discontinued for an appropriate period of time, you will not be able to receivean injection. Please adhere to instructions given to you about when to restart your blood thinning medication. If you have any questions please reach out to our team.    If you are taking Coumadin, please have your INR checked the morning of your procedure and bring the result to your appointment unless otherwise instructed. If your INR is over 1.2, your injection may need to be rescheduled to avoid uncontrolled bleeding from the needle placement.     Call [facility]  and ask for Pain Management at [phone] between 8am-4pm Monday - Friday if you are experiencing the following:    If you received an epidural or spinal injection:    -Headache that doesnot go away  with medicine, is worse when sitting or standing up, and is greatly relieved upon lying down.   -Severe pain worse than or different than your baseline pain.   -Chills or fever (101º F or greater).   -Drainage or signs of infection at the injection site     Go directly to the Emergency Department if you are experiencing the following and received an epidural or spinal injection:   -Abrupt weakness or progressive weakness in your legs that starts after you leave the clinic.   -Abrupt severe or worsening numbness in your legs.   -Inability to urinate after the injection or loss of bowel or bladder control without the urge to defecate or urinate.     If you have a clinical question that cannot wait until your next appointment, please call [phone] between 8am-4pm Monday - Friday or send a [employer] message. We do our best to return all non-emergency messages within 24 hours, Monday - Friday. A nurse or physician will return your message. You may also the appropriate nurse below, and they will do their best to answer your questions.  - For Dr. Sexton, call Rina at [phone]  - For Dr. Taylor, call Elke at [phone]  - For Dr. Cardozo, call Elke at [phone]  - For Dr. Cao, call Summer at [phone]  - For Dr. Wooten, call Summer at [phone]    If you need to cancel an appointment, please call the scheduling staff at [phone] during normal business hours or leave a message at least 24 hours in advance.     If you are going to be sedated for your next procedure, you MUST have responsible adult who can legally drive accompany you home. You cannot eat or drink for at least eight hours prior to the planned procedure if you are going to receive sedation. You may take your non-blood thinning medications with a small sip of water.

## 2024-11-15 NOTE — TELEPHONE ENCOUNTER
Ms Lovell states she had 90 % reduction in pain after second medial branch block. Scheduled ablations.

## 2024-12-16 ENCOUNTER — HOSPITAL ENCOUNTER (OUTPATIENT)
Facility: HOSPITAL | Age: 74
Discharge: HOME | End: 2024-12-16
Payer: COMMERCIAL

## 2024-12-16 VITALS
SYSTOLIC BLOOD PRESSURE: 124 MMHG | HEART RATE: 60 BPM | OXYGEN SATURATION: 100 % | TEMPERATURE: 97.9 F | RESPIRATION RATE: 16 BRPM | DIASTOLIC BLOOD PRESSURE: 78 MMHG

## 2024-12-16 DIAGNOSIS — M47.816 LUMBAR SPONDYLOSIS: ICD-10-CM

## 2024-12-16 PROCEDURE — 64636 DESTROY L/S FACET JNT ADDL: CPT | Performed by: ANESTHESIOLOGY

## 2024-12-16 PROCEDURE — 3700000012 HC SEDATION LEVEL 5+ TIME - INITIAL 15 MINUTES 5/> YEARS

## 2024-12-16 PROCEDURE — 2720000007 HC OR 272 NO HCPCS

## 2024-12-16 PROCEDURE — 64635 DESTROY LUMB/SAC FACET JNT: CPT | Mod: 50 | Performed by: ANESTHESIOLOGY

## 2024-12-16 PROCEDURE — 2500000004 HC RX 250 GENERAL PHARMACY W/ HCPCS (ALT 636 FOR OP/ED): Performed by: ANESTHESIOLOGY

## 2024-12-16 PROCEDURE — 64636 DESTROY L/S FACET JNT ADDL: CPT | Mod: 50 | Performed by: ANESTHESIOLOGY

## 2024-12-16 PROCEDURE — 64635 DESTROY LUMB/SAC FACET JNT: CPT | Performed by: ANESTHESIOLOGY

## 2024-12-16 RX ORDER — LIDOCAINE HYDROCHLORIDE 5 MG/ML
INJECTION, SOLUTION INFILTRATION; INTRAVENOUS
Status: DISPENSED
Start: 2024-12-16 | End: 2024-12-16

## 2024-12-16 RX ORDER — FENTANYL CITRATE 50 UG/ML
INJECTION, SOLUTION INTRAMUSCULAR; INTRAVENOUS
Status: COMPLETED | OUTPATIENT
Start: 2024-12-16 | End: 2024-12-16

## 2024-12-16 RX ORDER — LIDOCAINE HYDROCHLORIDE 20 MG/ML
INJECTION, SOLUTION EPIDURAL; INFILTRATION; INTRACAUDAL; PERINEURAL
Status: DISPENSED
Start: 2024-12-16 | End: 2024-12-16

## 2024-12-16 RX ORDER — LIDOCAINE HYDROCHLORIDE 5 MG/ML
INJECTION, SOLUTION INFILTRATION; INTRAVENOUS
Status: COMPLETED | OUTPATIENT
Start: 2024-12-16 | End: 2024-12-16

## 2024-12-16 RX ORDER — MIDAZOLAM HYDROCHLORIDE 1 MG/ML
INJECTION INTRAMUSCULAR; INTRAVENOUS
Status: DISPENSED
Start: 2024-12-16 | End: 2024-12-16

## 2024-12-16 RX ORDER — FENTANYL CITRATE 50 UG/ML
INJECTION, SOLUTION INTRAMUSCULAR; INTRAVENOUS
Status: DISPENSED
Start: 2024-12-16 | End: 2024-12-16

## 2024-12-16 RX ORDER — LIDOCAINE HYDROCHLORIDE 20 MG/ML
INJECTION, SOLUTION EPIDURAL; INFILTRATION; INTRACAUDAL; PERINEURAL
Status: COMPLETED | OUTPATIENT
Start: 2024-12-16 | End: 2024-12-16

## 2024-12-16 RX ORDER — MIDAZOLAM HYDROCHLORIDE 1 MG/ML
INJECTION, SOLUTION INTRAMUSCULAR; INTRAVENOUS
Status: COMPLETED | OUTPATIENT
Start: 2024-12-16 | End: 2024-12-16

## 2024-12-16 ASSESSMENT — PAIN SCALES - GENERAL
PAINLEVEL_OUTOF10: 0 - NO PAIN
PAINLEVEL_OUTOF10: 9
PAINLEVEL_OUTOF10: 0 - NO PAIN
PAINLEVEL_OUTOF10: 5 - MODERATE PAIN

## 2024-12-16 ASSESSMENT — PAIN - FUNCTIONAL ASSESSMENT
PAIN_FUNCTIONAL_ASSESSMENT: WONG-BAKER FACES
PAIN_FUNCTIONAL_ASSESSMENT: WONG-BAKER FACES
PAIN_FUNCTIONAL_ASSESSMENT: 0-10
PAIN_FUNCTIONAL_ASSESSMENT: WONG-BAKER FACES
PAIN_FUNCTIONAL_ASSESSMENT: 0-10
PAIN_FUNCTIONAL_ASSESSMENT: 0-10

## 2024-12-16 NOTE — DISCHARGE INSTRUCTIONS
DISCHARGE INSTRUCTIONS FOR INJECTIONS     You underwent bilateral L3-4, L4-5 and L5-S1 Lumbar facet radiofrequency ablations  today    After most injections, it is recommended that you relax and limit your activity for the remainder of the day unless you have been told otherwise by your pain physician.  You should not drive a car, operate machinery, or make important legal decisions unless otherwise directed by your pain physician.  You may resume your normal activity, including exercise, tomorrow.      Keep a written pain diary of how much pain relief you experienced following the injection procedure and the length of time of pain relief you experienced pain relief. Following diagnostic injections like medial branch nerve blocks, sacroiliac joint blocks, stellate ganglion injections and other blocks, it is very important you record the specific amount of pain relief you experienced immediately after the injectionand how long it lasted. Your doctor will ask you for this information at your follow up visit.     For all injections, please keep the injection site dry and inspect the site for a couple of days. You may remove the Band-Aid the day of the injection at any time.     Some discomfort, bruising or slight swelling may occur at the injection site. This is not abnormal if it occurs.  If needed you may:    -Take over the counter medication such as Tylenol or Motrin.   -Apply an ice pack for 30 minutes, 2 to 3 times a day for the first 24 hours.     You may shower today; no soaking baths, hot tubs, whirlpools or swimming pools for two days.      If you are given steroids in your injection, it may take 3-5 days for the steroid medication to take effect. You may notice a worsening of your symptoms for 1-2 days after the injection. This is not abnormal.  You may use acetaminophen, ibuprofen, or prescription medication that your doctor may have prescribed for you if you need to do so.     A few common side effects of  steroids include facial flushing, sweating, restlessness, irritability,difficulty sleeping, increase in blood sugar, and increased blood pressure. If you have diabetes, please monitor your blood sugar at least once a day for at least 5 days. If you have poorly controlled high blood pressure, monitoryour blood pressure for at least 2 days and contact your primary care physician if these numbers are unusually high for you.      If you take aspirin or non-steroidal anti-inflammatory drugs (examples are Motrin, Advil, ibuprofen, Naprosyn, Voltaren, Relafen, etc.) you may restart these this evening, but stop taking it 3 days before your next appointment, unless instructed otherwiseby your physician.      You do not need to discontinue non-aspirin-containing pain medications prior to an injection (examples: Celebrex, tramadol, hydrocodone and acetaminophen).      If you take a blood thinning medication (Coumadin, Lovenox, Fragmin,Ticlid, Plavix, Pradaxa, etc.), please discuss this with your primary care physician/cardiologist and your pain physician. These medications MUST be discontinued before you can have an injection safely, without the risk of uncontrolled bleeding. If these medications are not discontinued for an appropriate period of time, you will not be able to receivean injection. Please adhere to instructions given to you about when to restart your blood thinning medication. If you have any questions please reach out to our team.    If you are taking Coumadin, please have your INR checked the morning of your procedure and bring the result to your appointment unless otherwise instructed. If your INR is over 1.2, your injection may need to be rescheduled to avoid uncontrolled bleeding from the needle placement.     Call [facility]  and ask for Pain Management at [phone] between 8am-4pm Monday - Friday if you are experiencing the following:    If you received an epidural or spinal injection:    -Headache that  doesnot go away with medicine, is worse when sitting or standing up, and is greatly relieved upon lying down.   -Severe pain worse than or different than your baseline pain.   -Chills or fever (101º F or greater).   -Drainage or signs of infection at the injection site     Go directly to the Emergency Department if you are experiencing the following and received an epidural or spinal injection:   -Abrupt weakness or progressive weakness in your legs that starts after you leave the clinic.   -Abrupt severe or worsening numbness in your legs.   -Inability to urinate after the injection or loss of bowel or bladder control without the urge to defecate or urinate.     If you have a clinical question that cannot wait until your next appointment, please call [phone] between 8am-4pm Monday - Friday or send a [employer] message. We do our best to return all non-emergency messages within 24 hours, Monday - Friday. A nurse or physician will return your message. You may also the appropriate nurse below, and they will do their best to answer your questions.  - For Dr. Sexton, call [facility] at [phone]  - For Dr. Taylor, call [facility] at [phone]  - For Dr. Cardozo, call [facility] at [phone]  - For Dr. Cao, call [facility] at [phone]  - For Dr. Wooten, call [facility] at [phone]    If you need to cancel an appointment, please call the scheduling staff at [phone] during normal business hours or leave a message at least 24 hours in advance.     If you are going to be sedated for your next procedure, you MUST have responsible adult who can legally drive accompany you home. You cannot eat or drink for at least eight hours prior to the planned procedure if you are going to receive sedation. You may take your non-blood thinning medications with a small sip of water.

## 2024-12-16 NOTE — H&P
HISTORY AND PHYSICAL    History Of Present Illness  Vane Lovell is a 74 y.o. female presenting with chronic pain here for procedure as stated below. Patient denies any changes to health since the last visit to our clinic.    Past Medical History  Past Medical History:   Diagnosis Date    Disease of thyroid gland     Hypertension     Pacemaker        Surgical History  Past Surgical History:   Procedure Laterality Date    CHOLECYSTECTOMY      OTHER SURGICAL HISTORY  2021    Thyroid biopsy core needle    OTHER SURGICAL HISTORY  2021    Colonoscopy    OTHER SURGICAL HISTORY  2021    Pacemaker insertion    OTHER SURGICAL HISTORY  2021     section    OTHER SURGICAL HISTORY  2021    Gallbladder surgery    TUBAL LIGATION          Social History  She reports that she has never smoked. She has never used smokeless tobacco. She reports that she does not drink alcohol and does not use drugs.    Family History  Family History   Problem Relation Name Age of Onset    Other (malignant neoplasm) Mother Rina     Cancer Mother Rina     Other (malignant neoplasm) Father Jerry     Cancer Father Jerry     Heart attack Brother      Heart disease Brother Andrew         Allergies  Hydrocortisone, Iodinated contrast media, Lecithin, Soy, Soybean, Codeine, and Morphine (pf)    Review of Systems  12 point ROS done and negative except for the above.     Physical Exam  General: NAD, well groomed, well nourished  Eyes: Non-icteric sclera, EOMI  Ears, Nose, Mouth, and Throat: External ears and nose appear to be without deformity or rash. No lesions or masses noted. Hearing is grossly intact.   Neck: Trachea midline  Respiratory: Nonlabored breathing   Cardiovascular: No peripheral edema   Skin: No rashes or open lesions/ulcers identified on skin.      Last Recorded Vitals  There were no vitals taken for this visit.    Relevant Results  Current Outpatient Medications   Medication Instructions     cholecalciferol (Vitamin D-3) 50 MCG (2000 UT) tablet 1 tablet, Daily    ibuprofen 400 mg, Nightly PRN    Restasis 0.05 % ophthalmic emulsion 1 drop, 2 times daily    tretinoin (Retin-A) 0.05 % cream Nightly       No results found for this or any previous visit from the past 1000 days.      Medial Nerve Branch Block  Table formatting from the original result was not included.  Procedure  Medial Nerve Branch Block    Indication  Lumbar spondylosis    Medications  lidocaine PF (Xylocaine) 20 mg/mL (2 %) injection 3 mL   (Totals for administrations occurring from 1031 to 1041 on 11/15/24)     Preprocedure  A history and physical has been performed, and patient medication   allergies have been reviewed. The patient's tolerance of previous   anesthesia has been reviewed. The risks and benefits of the procedure and   the sedation options and risks were discussed with the patient. All   questions were answered and informed consent obtained.    Details of the Procedure  Preoperative diagnosis: Lumbar spondylosis  Postoperative diagnosis: Lumbar spondylosis  Procedure: Bilateral medial branch blocks at L3 and L4 as well as the L5   dorsal rami with fluoroscopic guidance, diagnostic block #2  Surgeon: Ameena Sexton  Assistant:  Fellow, Osman  Anesthesia: None  Complications: Apparently none    Clinical note: Vane is a 74-year-old female with a history of back pain   bilaterally.  She presents today for a second diagnostic block.  Patient   notes that after the first 1 she had 90 to 95% relief that was short   lasting, in line with the length of the local anesthetic.  Today she is   here for second procedure which if helpful we will pursue radiofrequency   ablation.    Procedure note: The patient was met in the preoperative holding area after   risks benefits and alternatives to procedure were discussed with the   patient, informed consent was obtained. Patient brought back to the   procedure room and placed in the  prone position on the fluoroscopy table.   Area over the lumbar spine was exposed, prepped, draped, in the usual   sterile fashion.  Trajectories the aforementioned medial branches and   dorsal rami were identified.  25-gauge needles were inserted and skin   advanced the appropriate locations in AP, oblique, and lateral views.    After the position of the needles have been confirmed in the AP, oblique,   and lateral views, at each needle a total of0.5 mL of 2% lidocaine was   injected through each needle.  Needles removed, bandages applied, patient   tolerated the procedure well with no immediate complications.    The patient was immediately assessed following the procedure and reported   improvement of 90% or more.  She tested out her back in front of me   including bending, twisting, extension of the back and had significant   improvement which she reported was 90% or more of her typical pain relief.    Diagnostic block #1: 90 to 95% reduction in pain.    Diagnostic block #2: 90% reduction in pain.    We discussed radiofrequency ablation and we will plan to schedule for   that.    Procedure Provider  Ameena Sexton MD    Procedure Location  [facility]  [id number] [place] [id number] [phone]    Referring Provider  Ameena Sexton MD  [id number]  Department Of Anesthesiology And [place] [address]     No diagnosis found.        Assessment/Plan   Vane Lovell is a 74 y.o. female presenting with chronic pain here for  bilateral L4-5 and L5-S1 Lumbar facet radiofrequency ablations; she denies any recent antibiotic use or infections, she denies any blood thinner use , and she denies contrast or local anesthetic allergies.    ASA PS Classification: 2  Mallampati score: 2    Plan:  - We will proceed with the procedure as above. We discussed extensively the risks, benefits, and alternatives to the procedure. The patient's  questions were addressed and answered in detail. The patient demonstrated understanding of the procedure, and is amenable to proceeding with it. The Risks of the procedure that were discussed with the patient include but are not limited to the following: A lack of efficacy, transient worsening of pain, bleeding, infection, nerve injury, nerve damage, neuritis or sunburn sensation. Informed consent obtained as attached to EMR.  - Patient to continue physician directed home exercises as tolerated.  - Patient will follow-up with us in clinic.    aMrv Massey,   Pain Fellow

## 2025-02-07 LAB — NON-UH HIE GP HPV: POSITIVE

## 2025-02-11 ENCOUNTER — OFFICE VISIT (OUTPATIENT)
Dept: PAIN MEDICINE | Facility: HOSPITAL | Age: 75
End: 2025-02-11
Payer: COMMERCIAL

## 2025-02-11 DIAGNOSIS — M47.816 LUMBAR SPONDYLOSIS: ICD-10-CM

## 2025-02-11 PROCEDURE — 99213 OFFICE O/P EST LOW 20 MIN: CPT | Performed by: ANESTHESIOLOGY

## 2025-02-11 PROCEDURE — 1159F MED LIST DOCD IN RCRD: CPT | Performed by: ANESTHESIOLOGY

## 2025-02-11 NOTE — PROGRESS NOTES
Chief complaint: Back pain    HPI   Ms. Lovell is a 74-year-old female with a history of back pain.  She has pain that is predominantly axial.  She has a history and physical which had been most consistent with lumbar spondylosis.  She had 2 facet blocks the first of which gave her 80% relief and the second diagnostic block gave her 100% or so relief.  She subsequently underwent radiofrequency ablation.  Radiofrequency ablation was done on 12/16/2024 which did not give her the relief that she was looking for.  She said that radiofrequency ablation was done on 12/16/2024 which did not give her the relief that she was looking for.  She said that at most she had a 40% reduction in pain.  The patient says that the morning still the worst but bending causes problems and significant pain.  She has a difficult time going up and down, shifting positions.  She feels a band of pain across the low back and for the past 2 to 3 weeks has also been having symptoms that are in the anterior thigh.  She says that heat helps and she cannot stand for more than 20 minutes at a time.  Sometimes she will also have symptoms occur when walking.  She does core exercises and keeps up with exercises like she learned at prior formal physical therapy at least 2-3 times a week and has been doing that for many months at this time.  She says the pain is aching in nature and it has been ongoing for years but seems to be overall progressively worsening.    We do not have any prior advanced imaging for the patient.  We had discussed the potential for an MRI but she has a pacemaker and is not MRI compatible.  Prior x-ray showed multilevel degenerative changes, osteophytes with only mild disc space narrowing.  She had facet arthropathy.    ROS: 13 point review of systems is complete and is negative listed above in HPI    Past Medical History:   Diagnosis Date    Disease of thyroid gland     Hypertension     Pacemaker        Past Surgical History:    Procedure Laterality Date    CHOLECYSTECTOMY      OTHER SURGICAL HISTORY  2021    Thyroid biopsy core needle    OTHER SURGICAL HISTORY  2021    Colonoscopy    OTHER SURGICAL HISTORY  2021    Pacemaker insertion    OTHER SURGICAL HISTORY  2021     section    OTHER SURGICAL HISTORY  2021    Gallbladder surgery    TUBAL LIGATION         Family History   Problem Relation Name Age of Onset    Other (malignant neoplasm) Mother Rina     Cancer Mother Rina     Other (malignant neoplasm) Father Jerry     Cancer Father Jerry     Heart attack Brother      Heart disease Brother Andrew        Social History     Tobacco Use    Smoking status: Never    Smokeless tobacco: Never    Tobacco comments:     Never   Vaping Use    Vaping status: Never Used   Substance Use Topics    Alcohol use: Never    Drug use: Never       Current Outpatient Medications on File Prior to Visit   Medication Sig Dispense Refill    cholecalciferol (Vitamin D-3) 50 MCG (2000 UT) tablet Take 1 tablet (2,000 Units) by mouth once daily.      ibuprofen 200 mg tablet Take 2 tablets (400 mg) by mouth as needed at bedtime for mild pain (1 - 3).      Restasis 0.05 % ophthalmic emulsion Administer 1 drop into both eyes twice a day.      tretinoin (Retin-A) 0.05 % cream Apply topically once daily at bedtime.       No current facility-administered medications on file prior to visit.        Allergies   Allergen Reactions    Hydrocortisone Other     ?tachycardia    Iodinated Contrast Media Hives    Lecithin Nausea/vomiting    Soy Unknown    Soybean Unknown    Codeine Other, Rash and Nausea/vomiting     Nausea    Morphine (Pf) Other     nightmares          Imaging:  XR Lumbar spine  AP and lateral 3 views supine     CLINICAL HISTORY: PAIN, lower back and pelvic pain     Comparison: None     Findings:   There are 5 lumbar vertebral bodies with mild levoscoliosis. No fracture or compression deformity or significant listhesis.  Multilevel small to moderate endplate spurs and only mild disc space narrowing. Lower lumbar facet arthropathy. Symmetric SI joints. No sacral lesion. Moderate to large amount of stool in the colon..     IMPRESSION:   Degenerative changes and mild levoscoliosis.     Physical Exam:  Gen.: No acute distress  Eyes: Pupils symmetric  ENT: Hearing intact  Respiratory: No gasping or shortness of breath throughout exam  Cardiovascular: Extremity show no edema  Skin: No rashes noted  Musculoskeletal: Gait is grossly normal, ambulates without assist device, some pain with facet loading.  Intact strength and sensation.  Neurologic: Cranial nerves II through XII are grossly intact  Psychiatric:  Patient is alert and oriented x3, pleasant, appropriate mood    Impression/Plan:  74-year-old female with a history of back pain who is unable to obtain an MRI.  She has primarily axial pain and we discussed the potential moving forward to obtain some advanced imaging of the spine to better elucidate where her pain is stemming from.  We discussed a CT scan of the lumbar area.  Once we have her scan done we will consider further intervention.  Due to the patient's pacemaker she cannot have an MRI which would be the more optimal study.  Encouraged the patient the meantime to keep up with physician directed exercises and core strengthening.

## 2025-02-12 LAB — NON-UH HIE THINPREP TIS PAP: NORMAL

## 2025-02-13 LAB
NON-UH HIE GENOTYPE 16: NEGATIVE
NON-UH HIE GENOTYPE 18/45: NEGATIVE

## 2025-02-22 ENCOUNTER — APPOINTMENT (OUTPATIENT)
Dept: RADIOLOGY | Facility: CLINIC | Age: 75
End: 2025-02-22
Payer: COMMERCIAL

## 2025-03-01 ENCOUNTER — APPOINTMENT (OUTPATIENT)
Dept: RADIOLOGY | Facility: CLINIC | Age: 75
End: 2025-03-01
Payer: COMMERCIAL

## 2025-03-01 ENCOUNTER — HOSPITAL ENCOUNTER (OUTPATIENT)
Dept: RADIOLOGY | Facility: CLINIC | Age: 75
Discharge: HOME | End: 2025-03-01
Payer: COMMERCIAL

## 2025-03-01 DIAGNOSIS — M47.816 LUMBAR SPONDYLOSIS: ICD-10-CM

## 2025-03-01 PROCEDURE — 72131 CT LUMBAR SPINE W/O DYE: CPT

## 2025-03-06 DIAGNOSIS — M54.16 LUMBAR RADICULOPATHY: ICD-10-CM

## 2025-03-08 ENCOUNTER — APPOINTMENT (OUTPATIENT)
Dept: RADIOLOGY | Facility: CLINIC | Age: 75
End: 2025-03-08
Payer: COMMERCIAL

## 2025-03-11 ASSESSMENT — ENCOUNTER SYMPTOMS
COUGH: 1
RHINORRHEA: 1
SORE THROAT: 1

## 2025-03-12 ENCOUNTER — OFFICE VISIT (OUTPATIENT)
Dept: PRIMARY CARE | Facility: CLINIC | Age: 75
End: 2025-03-12
Payer: COMMERCIAL

## 2025-03-12 VITALS
TEMPERATURE: 97.9 F | BODY MASS INDEX: 30.51 KG/M2 | DIASTOLIC BLOOD PRESSURE: 78 MMHG | OXYGEN SATURATION: 98 % | WEIGHT: 165.8 LBS | SYSTOLIC BLOOD PRESSURE: 120 MMHG | HEART RATE: 60 BPM | HEIGHT: 62 IN

## 2025-03-12 DIAGNOSIS — B00.1 RECURRENT COLD SORES: ICD-10-CM

## 2025-03-12 DIAGNOSIS — D72.819 LEUKOPENIA, UNSPECIFIED TYPE: Primary | ICD-10-CM

## 2025-03-12 DIAGNOSIS — Z12.31 ENCOUNTER FOR SCREENING MAMMOGRAM FOR BREAST CANCER: ICD-10-CM

## 2025-03-12 DIAGNOSIS — E55.9 VITAMIN D DEFICIENCY: ICD-10-CM

## 2025-03-12 DIAGNOSIS — R09.89 SINUS SYMPTOM: ICD-10-CM

## 2025-03-12 DIAGNOSIS — Z13.220 SCREENING, LIPID: ICD-10-CM

## 2025-03-12 DIAGNOSIS — E28.319 ASYMPTOMATIC PREMATURE MENOPAUSE: ICD-10-CM

## 2025-03-12 DIAGNOSIS — Z11.59 NEED FOR HEPATITIS C SCREENING TEST: ICD-10-CM

## 2025-03-12 LAB — POC SARS-COV-2 AG BINAX: NORMAL

## 2025-03-12 PROCEDURE — 99214 OFFICE O/P EST MOD 30 MIN: CPT | Performed by: INTERNAL MEDICINE

## 2025-03-12 PROCEDURE — 1160F RVW MEDS BY RX/DR IN RCRD: CPT | Performed by: INTERNAL MEDICINE

## 2025-03-12 PROCEDURE — 1159F MED LIST DOCD IN RCRD: CPT | Performed by: INTERNAL MEDICINE

## 2025-03-12 PROCEDURE — 1124F ACP DISCUSS-NO DSCNMKR DOCD: CPT | Performed by: INTERNAL MEDICINE

## 2025-03-12 PROCEDURE — 1036F TOBACCO NON-USER: CPT | Performed by: INTERNAL MEDICINE

## 2025-03-12 PROCEDURE — 3008F BODY MASS INDEX DOCD: CPT | Performed by: INTERNAL MEDICINE

## 2025-03-12 PROCEDURE — 1125F AMNT PAIN NOTED PAIN PRSNT: CPT | Performed by: INTERNAL MEDICINE

## 2025-03-12 PROCEDURE — 87811 SARS-COV-2 COVID19 W/OPTIC: CPT | Performed by: INTERNAL MEDICINE

## 2025-03-12 RX ORDER — AMOXICILLIN AND CLAVULANATE POTASSIUM 500; 125 MG/1; MG/1
TABLET, FILM COATED ORAL
Qty: 14 TABLET | Refills: 0 | Status: SHIPPED | OUTPATIENT
Start: 2025-03-12

## 2025-03-12 RX ORDER — ACYCLOVIR 400 MG/1
TABLET ORAL
Qty: 9 TABLET | Refills: 2 | Status: SHIPPED | OUTPATIENT
Start: 2025-03-12

## 2025-03-12 ASSESSMENT — PATIENT HEALTH QUESTIONNAIRE - PHQ9
SUM OF ALL RESPONSES TO PHQ9 QUESTIONS 1 & 2: 0
1. LITTLE INTEREST OR PLEASURE IN DOING THINGS: NOT AT ALL
2. FEELING DOWN, DEPRESSED OR HOPELESS: NOT AT ALL

## 2025-03-12 ASSESSMENT — PAIN SCALES - GENERAL: PAINLEVEL_OUTOF10: 9

## 2025-03-12 NOTE — PROGRESS NOTES
"Answers submitted by the patient for this visit:  Cough Questionnaire (Submitted on 3/11/2025)  Chief Complaint: Cough  Chronicity: new  Onset: 1 to 4 weeks ago  Progression since onset: waxing and waning  Frequency: hourly  Cough characteristics: non-productive  nasal congestion: Yes  postnasal drip: Yes  rhinorrhea: Yes  sore throat: Yes  Aggravated by: nothing    Chief Complaint   Patient presents with    Facial Pain    Cough     Pt here for facial pain/burning and cough that is mostly dry.  Onset 3 weeks, states \"It feels like it is mostly in my sinuses.  And the sneezing can be powerful.\"     Last 05/2024  Dr Weber 11/2024 evaluation of blood pressure.   Pain Management  -Dr Ameena Sexton, plan epidural injection    03/2025-sore throat for few days. Sinus symptoms. Not much cough sometimes tickle throat. Rare coughing fits. No wheezing. +sneezing.   Mouth sores for months. Had them in the distant pass. Blisters.       Past medical history  Pulmonary embolism 2013 while on hormone replacement therapy.  Bradycardia, pacemaker Dr Sifuentes  Chronic neutropenia previously evaluated by Dr. Leal  Multinodular goiter Dr Clayton  Knee pain Dr Puente  colon polyp colonoscopy 06/2024  Osteopenia Dexa 2022   left breast reduction 11/2022 [facility] Dr Felipe.     Nonsmoker  works at . boyfriend passed away. she took care of his son. she has two daughters  still working, works from home.     Blood pressure 120/78, pulse 60, temperature 36.6 °C (97.9 °F), height 1.575 m (5' 2\"), weight 75.2 kg (165 lb 12.8 oz), SpO2 98%.  HEENT nc/at EOMI. PERRL  Ears: TM intact. No erythema  Throat: no exudate.  Lungs: CTA without wrr. Breath sounds symmetric.   CV: RRR S1 S2 normal no murmur.     07/2024 lipid, cmp, cbc    09/2022 Dr HOPKINS. cbc, bmp  wbc 6.6 hg 13.5 platelet 235  Na 139 K 4.5 Cr 0.7 glucose 85     02/2022 Dr HOPKINS Cbc, bmp  wbc 6.6 hg 13.5 platelet 236  K 4.5 cr 0.7 glucose 85 Na 139    1. Leukopenia, unspecified type " (Primary)  - CBC and Auto Differential; Future  - CBC and Auto Differential  - CBC; Future      2. Sinus symptom  - POCT BinaxNOW Covid-19 Ag Card manually resulted  - amoxicillin-pot clavulanate (Augmentin) 500-125 mg tablet; One po bid  Dispense: 14 tablet; Refill: 0  Medication use discussed with patient including potential benefits and side effects. Patient verbalized understanding and agreed to proceed.     3. Recurrent cold sores  - acyclovir (Zovirax) 400 mg tablet; 1 po tid for 3 days prn cold sore  Dispense: 9 tablet; Refill: 2    4. Vitamin D deficiency  - Comprehensive Metabolic Panel; Future  - Vitamin D 25-Hydroxy,Total (for eval of Vitamin D levels); Future    5. Screening, lipid  - Lipid Panel; Future    6. Need for hepatitis C screening test  - Hepatitis C Antibody; Future    7. Encounter for screening mammogram for breast cancer  - BI mammo bilateral screening tomosynthesis; Future    8. Asymptomatic premature menopause  - XR DEXA bone density; Future      Mammogram 07/2024-diagnostic right -benign fat necrosis  PAP 02/2025 including hpv testing per gyn  Colonoscopy 06/2024 Dr Sharpe plan repeat 5 years.       Current Outpatient Medications on File Prior to Visit   Medication Sig Dispense Refill    cholecalciferol (Vitamin D-3) 50 MCG (2000 UT) tablet Take 1 tablet (2,000 Units) by mouth once daily.      DM/p-ephed/acetaminoph/doxylam (NYQUIL D ORAL) Take by mouth. As directed      ibuprofen 200 mg tablet Take 2 tablets (400 mg) by mouth as needed at bedtime for mild pain (1 - 3).      mv-min/vit C/glut/lysine/hb124 ([place], LYSINE HCL, ORAL) Take by mouth. As directed      Restasis 0.05 % ophthalmic emulsion Administer 1 drop into both eyes twice a day.      tretinoin (Retin-A) 0.05 % cream Apply topically once daily at bedtime.       No current facility-administered medications on file prior to visit.

## 2025-03-13 LAB
BASOPHILS # BLD AUTO: 52 CELLS/UL (ref 0–200)
BASOPHILS NFR BLD AUTO: 0.7 %
EOSINOPHIL # BLD AUTO: 141 CELLS/UL (ref 15–500)
EOSINOPHIL NFR BLD AUTO: 1.9 %
ERYTHROCYTE [DISTWIDTH] IN BLOOD BY AUTOMATED COUNT: 12.3 % (ref 11–15)
HCT VFR BLD AUTO: 39 % (ref 35–45)
HGB BLD-MCNC: 13.5 G/DL (ref 11.7–15.5)
LYMPHOCYTES # BLD AUTO: 1843 CELLS/UL (ref 850–3900)
LYMPHOCYTES NFR BLD AUTO: 24.9 %
MCH RBC QN AUTO: 30.3 PG (ref 27–33)
MCHC RBC AUTO-ENTMCNC: 34.6 G/DL (ref 32–36)
MCV RBC AUTO: 87.4 FL (ref 80–100)
MONOCYTES # BLD AUTO: 807 CELLS/UL (ref 200–950)
MONOCYTES NFR BLD AUTO: 10.9 %
NEUTROPHILS # BLD AUTO: 4558 CELLS/UL (ref 1500–7800)
NEUTROPHILS NFR BLD AUTO: 61.6 %
PLATELET # BLD AUTO: 241 THOUSAND/UL (ref 140–400)
PMV BLD REES-ECKER: 8.8 FL (ref 7.5–12.5)
RBC # BLD AUTO: 4.46 MILLION/UL (ref 3.8–5.1)
WBC # BLD AUTO: 7.4 THOUSAND/UL (ref 3.8–10.8)

## 2025-04-04 ENCOUNTER — APPOINTMENT (OUTPATIENT)
Facility: HOSPITAL | Age: 75
End: 2025-04-04
Payer: COMMERCIAL

## 2025-07-01 DIAGNOSIS — Z13.220 SCREENING, LIPID: ICD-10-CM

## 2025-07-01 DIAGNOSIS — E55.9 VITAMIN D DEFICIENCY: ICD-10-CM

## 2025-07-01 DIAGNOSIS — D72.819 LEUKOPENIA, UNSPECIFIED TYPE: ICD-10-CM

## 2025-07-01 DIAGNOSIS — Z11.59 NEED FOR HEPATITIS C SCREENING TEST: ICD-10-CM

## 2025-07-25 ENCOUNTER — APPOINTMENT (OUTPATIENT)
Dept: PRIMARY CARE | Facility: CLINIC | Age: 75
End: 2025-07-25
Payer: COMMERCIAL